# Patient Record
Sex: MALE | Race: OTHER | Employment: OTHER | URBAN - METROPOLITAN AREA
[De-identification: names, ages, dates, MRNs, and addresses within clinical notes are randomized per-mention and may not be internally consistent; named-entity substitution may affect disease eponyms.]

---

## 2017-03-10 NOTE — PATIENT DISCUSSION
Continue: Refresh Celluvisc (carboxymethylcellulose sodium): dropperette,gel: 1% 1 drop three times a day as needed into both eyes

## 2021-01-27 NOTE — PATIENT DISCUSSION
POSTERIOR CAPSULAR FIBROSIS, OU : NOT VISUALLY SIGNIFICANT. FOLLOW. This patient contacted office for the following prescriptions to be filled: 
 
Last office visit: 1/21/21 Follow up appointment:n/a 
Medication requested :  
Requested Prescriptions Pending Prescriptions Disp Refills  hydroCHLOROthiazide (HYDRODIURIL) 25 mg tablet 90 Tab 1 Sig: Take 1 Tab by mouth daily.  ibuprofen (MOTRIN) 800 mg tablet 90 Tab 0 Sig: Take 1 Tab by mouth every eight (8) hours as needed for Pain.  meloxicam (Mobic) 15 mg tablet 30 Tab 0 Sig: Take 1 Tab by mouth daily (with breakfast).  triamcinolone acetonide (KENALOG) 0.1 % ointment 2270 g 0 Sig: Apply  to affected area two (2) times a day. use thin layer PCP:  Tiera Garcia Mail order or Local pharmacy name [facility]  [phone]

## 2021-05-06 ENCOUNTER — NEW PATIENT COMPREHENSIVE (OUTPATIENT)
Dept: URBAN - METROPOLITAN AREA CLINIC 32 | Facility: CLINIC | Age: 46
End: 2021-05-06

## 2021-05-06 DIAGNOSIS — H52.03: ICD-10-CM

## 2021-05-06 DIAGNOSIS — H52.203: ICD-10-CM

## 2021-05-06 DIAGNOSIS — H52.4: ICD-10-CM

## 2021-05-06 PROCEDURE — 92015 DETERMINE REFRACTIVE STATE: CPT

## 2021-05-06 PROCEDURE — 92004 COMPRE OPH EXAM NEW PT 1/>: CPT

## 2021-05-06 ASSESSMENT — VISUAL ACUITY
OD_SC: 20/30+1
OU_CC: J1
OS_SC: 20/25
OU_SC: 20/30

## 2021-05-06 ASSESSMENT — TONOMETRY
OD_IOP_MMHG: 14
OS_IOP_MMHG: 13

## 2021-10-29 NOTE — PATIENT DISCUSSION
Clouding of implant- Not visually significant, no need to remedy with YAG laser capsulotomy at this time, will continue to monitor.

## 2023-06-28 ENCOUNTER — COMPREHENSIVE EXAM (OUTPATIENT)
Dept: URBAN - METROPOLITAN AREA CLINIC 32 | Facility: CLINIC | Age: 48
End: 2023-06-28

## 2023-06-28 DIAGNOSIS — H52.203: ICD-10-CM

## 2023-06-28 PROCEDURE — 92015 DETERMINE REFRACTIVE STATE: CPT

## 2023-06-28 PROCEDURE — 92014 COMPRE OPH EXAM EST PT 1/>: CPT

## 2023-06-28 ASSESSMENT — TONOMETRY
OS_IOP_MMHG: 17
OD_IOP_MMHG: 18

## 2023-06-28 ASSESSMENT — VISUAL ACUITY
OU_CC: 20/20
OU_CC: J2
OD_CC: 20/25
OS_CC: J2
OD_CC: J3
OS_CC: 20/25+1

## 2024-11-20 ENCOUNTER — COMPREHENSIVE EXAM (OUTPATIENT)
Dept: URBAN - METROPOLITAN AREA CLINIC 32 | Facility: CLINIC | Age: 49
End: 2024-11-20

## 2024-11-20 DIAGNOSIS — H52.203: ICD-10-CM

## 2024-11-20 PROCEDURE — 92014 COMPRE OPH EXAM EST PT 1/>: CPT

## 2024-11-20 PROCEDURE — 92015 DETERMINE REFRACTIVE STATE: CPT
